# Patient Record
Sex: FEMALE | Race: WHITE | NOT HISPANIC OR LATINO | Employment: OTHER | URBAN - METROPOLITAN AREA
[De-identification: names, ages, dates, MRNs, and addresses within clinical notes are randomized per-mention and may not be internally consistent; named-entity substitution may affect disease eponyms.]

---

## 2023-12-19 ENCOUNTER — ANCILLARY PROCEDURE (OUTPATIENT)
Dept: RADIOLOGY | Facility: CLINIC | Age: 68
End: 2023-12-19
Payer: MEDICARE

## 2023-12-19 ENCOUNTER — OFFICE VISIT (OUTPATIENT)
Dept: OPHTHALMOLOGY | Facility: CLINIC | Age: 68
End: 2023-12-19
Payer: MEDICARE

## 2023-12-19 DIAGNOSIS — H52.203 ASTIGMATISM OF BOTH EYES WITH PRESBYOPIA: ICD-10-CM

## 2023-12-19 DIAGNOSIS — H52.4 ASTIGMATISM OF BOTH EYES WITH PRESBYOPIA: ICD-10-CM

## 2023-12-19 DIAGNOSIS — H57.89 THYROID EYE DISEASE: ICD-10-CM

## 2023-12-19 DIAGNOSIS — Z96.1 PSEUDOPHAKIA OF BOTH EYES: ICD-10-CM

## 2023-12-19 DIAGNOSIS — H18.9 EXPOSURE KERATOPATHY: Primary | ICD-10-CM

## 2023-12-19 DIAGNOSIS — E07.9 THYROID EYE DISEASE: ICD-10-CM

## 2023-12-19 DIAGNOSIS — E78.5 HYPERLIPIDEMIA, UNSPECIFIED: ICD-10-CM

## 2023-12-19 DIAGNOSIS — H04.123 DRY EYES, BILATERAL: ICD-10-CM

## 2023-12-19 DIAGNOSIS — H02.22C MECHANICAL LAGOPHTHALMOS OF BOTH UPPER AND LOWER EYELIDS OF BOTH EYES: ICD-10-CM

## 2023-12-19 PROBLEM — H02.226 MECHANICAL LAGOPHTHALMOS OF EYELIDS OF BOTH EYES: Status: ACTIVE | Noted: 2023-12-19

## 2023-12-19 PROBLEM — H02.223 MECHANICAL LAGOPHTHALMOS OF EYELIDS OF BOTH EYES: Status: ACTIVE | Noted: 2023-12-19

## 2023-12-19 PROCEDURE — 92015 DETERMINE REFRACTIVE STATE: CPT | Performed by: OPTOMETRIST

## 2023-12-19 PROCEDURE — 92014 COMPRE OPH EXAM EST PT 1/>: CPT | Performed by: OPTOMETRIST

## 2023-12-19 PROCEDURE — 75571 CT HRT W/O DYE W/CA TEST: CPT

## 2023-12-19 RX ORDER — NAPROXEN SODIUM 220 MG
220 TABLET ORAL EVERY 12 HOURS
COMMUNITY

## 2023-12-19 RX ORDER — BENZONATATE 100 MG/1
CAPSULE ORAL
COMMUNITY
Start: 2023-09-14

## 2023-12-19 RX ORDER — BETAMETHASONE DIPROPIONATE 0.5 MG/G
CREAM TOPICAL
COMMUNITY

## 2023-12-19 RX ORDER — DOXYCYCLINE HYCLATE 100 MG
100 TABLET ORAL 2 TIMES DAILY
COMMUNITY
Start: 2023-03-15 | End: 2023-03-29

## 2023-12-19 RX ORDER — ALENDRONATE SODIUM 70 MG/1
TABLET ORAL
COMMUNITY
Start: 2016-06-12

## 2023-12-19 RX ORDER — ANASTROZOLE 1 MG/1
1 TABLET ORAL
COMMUNITY
Start: 2020-01-23

## 2023-12-19 RX ORDER — LEVOTHYROXINE SODIUM 75 UG/1
75 TABLET ORAL
COMMUNITY
Start: 2023-06-12

## 2023-12-19 RX ORDER — CYCLOSPORINE 0.5 MG/ML
1 EMULSION OPHTHALMIC
COMMUNITY
Start: 2015-04-16

## 2023-12-19 RX ORDER — RISEDRONATE SODIUM 150 MG/1
150 TABLET, FILM COATED ORAL
COMMUNITY

## 2023-12-19 RX ORDER — ATORVASTATIN CALCIUM 40 MG/1
40 TABLET, FILM COATED ORAL NIGHTLY
COMMUNITY
Start: 2023-12-18 | End: 2024-06-15

## 2023-12-19 ASSESSMENT — CONF VISUAL FIELD
OS_NORMAL: 1
OS_SUPERIOR_TEMPORAL_RESTRICTION: 0
OS_SUPERIOR_NASAL_RESTRICTION: 0
METHOD: COUNTING FINGERS
OS_INFERIOR_TEMPORAL_RESTRICTION: 0
OD_INFERIOR_NASAL_RESTRICTION: 0
OD_NORMAL: 1
OD_SUPERIOR_TEMPORAL_RESTRICTION: 0
OD_INFERIOR_TEMPORAL_RESTRICTION: 0
OS_INFERIOR_NASAL_RESTRICTION: 0
OD_SUPERIOR_NASAL_RESTRICTION: 0

## 2023-12-19 ASSESSMENT — REFRACTION_MANIFEST
OS_SPHERE: +1.50
OD_CYLINDER: -0.75
OD_ADD: +2.50
OD_AXIS: 060
OS_ADD: +2.50
OD_SPHERE: +1.25
OS_AXIS: 060
OS_CYLINDER: -0.50

## 2023-12-19 ASSESSMENT — CUP TO DISC RATIO
OS_RATIO: .4
OD_RATIO: .4

## 2023-12-19 ASSESSMENT — VISUAL ACUITY
METHOD: SNELLEN - LINEAR
OS_SC: 20/50
OD_SC: 20/30

## 2023-12-19 ASSESSMENT — TONOMETRY
OS_IOP_MMHG: 12
IOP_METHOD: GOLDMANN APPLANATION
OD_IOP_MMHG: 10

## 2023-12-19 ASSESSMENT — SLIT LAMP EXAM - LIDS
COMMENTS: RETRACTION
COMMENTS: RETRACTION

## 2023-12-19 ASSESSMENT — EXTERNAL EXAM - LEFT EYE: OS_EXAM: ENOPHTHALMOS

## 2023-12-19 ASSESSMENT — ENCOUNTER SYMPTOMS: EYES NEGATIVE: 1

## 2023-12-19 ASSESSMENT — EXTERNAL EXAM - RIGHT EYE: OD_EXAM: NORMAL

## 2023-12-19 NOTE — PROGRESS NOTES
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 Not wearing RGP anymore OS as is not needed.  Still limited EOM especially upgaze and still mild exposure of inferior cornea without symptoms.  Continue Restasis Rx sent to pharmacy. A spectacle prescription was dispensed to be used as needed.  [facility] OD 20/25 was 20/20 and 20/30 was 20/30 and stable.  Minimal trichiasis   RUL and LL and no need for epilation.  Will monitor.  Ocular health stable and full DFE completed today.  RTc 1 year for full exam.

## 2025-01-14 ENCOUNTER — APPOINTMENT (OUTPATIENT)
Dept: OPHTHALMOLOGY | Facility: CLINIC | Age: 70
End: 2025-01-14
Payer: MEDICARE

## 2025-08-12 ENCOUNTER — APPOINTMENT (OUTPATIENT)
Dept: OPHTHALMOLOGY | Facility: CLINIC | Age: 70
End: 2025-08-12
Payer: MEDICARE